# Patient Record
Sex: FEMALE | Race: WHITE
[De-identification: names, ages, dates, MRNs, and addresses within clinical notes are randomized per-mention and may not be internally consistent; named-entity substitution may affect disease eponyms.]

---

## 2021-12-20 ENCOUNTER — HOSPITAL ENCOUNTER (OUTPATIENT)
Dept: HOSPITAL 46 - DS | Age: 56
Discharge: HOME | End: 2021-12-20
Attending: SURGERY
Payer: COMMERCIAL

## 2021-12-20 VITALS — WEIGHT: 175.27 LBS | HEIGHT: 64 IN | BODY MASS INDEX: 29.92 KG/M2

## 2021-12-20 DIAGNOSIS — D12.0: ICD-10-CM

## 2021-12-20 DIAGNOSIS — D12.2: Primary | ICD-10-CM

## 2021-12-20 DIAGNOSIS — K57.30: ICD-10-CM

## 2021-12-20 PROCEDURE — 0DBK8ZX EXCISION OF ASCENDING COLON, VIA NATURAL OR ARTIFICIAL OPENING ENDOSCOPIC, DIAGNOSTIC: ICD-10-PCS | Performed by: SURGERY

## 2021-12-20 PROCEDURE — G0500 MOD SEDAT ENDO SERVICE >5YRS: HCPCS

## 2021-12-20 PROCEDURE — 0DBE8ZX EXCISION OF LARGE INTESTINE, VIA NATURAL OR ARTIFICIAL OPENING ENDOSCOPIC, DIAGNOSTIC: ICD-10-PCS | Performed by: SURGERY

## 2021-12-20 PROCEDURE — 0DBH8ZX EXCISION OF CECUM, VIA NATURAL OR ARTIFICIAL OPENING ENDOSCOPIC, DIAGNOSTIC: ICD-10-PCS | Performed by: SURGERY

## 2021-12-20 NOTE — NUR
PT ALERT, ORIENTED AND SEEMS PREPARED. PT HAS HAD PREVIOUS SCOPE, ALL OF HER
QUESTIONS ANSWERED. PT'S  WILL P-UP FOLLOWING DC. PT REQUSTTED PRAYER
WILL FOLLOW AS NEEDED

## 2021-12-20 NOTE — OR
[facility]
                                    [address], [place]  [address]
_________________________________________________________________________________________
                                                                 Draft    
 
 
DATE OF OPERATION:
12/20/2021
 
SURGEON:
Ed Garcia MD
 
PREOPERATIVE DIAGNOSIS:
History of adenomatous polyps, 2018.
 
POSTOPERATIVE DIAGNOSES:
1. Sigmoid and left-sided diverticulosis.
2. Polyps x4.
 
PROCEDURE:
Total colonoscopy to cecum with hot snare polypectomy x1, cold snare polypectomy x1 and
cold morcellation polypectomy x2. 
 
ANESTHESIA:
Intravenous sedation, fentanyl 100 mcg and Versed 6 mg.
 
INDICATION:
This 56-year-old white woman is a patient of Dr. Shafer.  She is practicing
optometrist locally.  She underwent colonoscopy by me in 2018, at which time she was
found to have adenomatous polyps.  She has no family history of colon cancer though her
mother had polyps.  She is admitted for surveillance colonoscopy.  She understands the
risks of bleeding, infection, and perforation. 
 
FINDINGS:
The prep was excellent.  Complete colonoscopy was undertaken to the cecum without
question.  She had four polyps in total, one in the cecum, one in the proximal ascending
colon, one at 30 cm and one at 20 cm.  All were excised completely and all were almost
certainly adenomatous. 
 
DESCRIPTION OF PROCEDURE:
The patient was brought to the endoscopy suite and placed in lateral decubitus position,
given intravenous sedation to the point of slurred speech and nystagmus.  Digital rectal
examination was normal. 
 
An Olympus video colonoscope was passed in the rectum and manipulated throughout the
colon noting diverticular change of the sigmoid and left colon.  The scope was passed
ultimately into the cecum.  Ileocecal valve and appendiceal orifice were normal.  A
small polyp was noted on a fold in the cecum.  This was excised with cold snare
 
                                                                                    
_________________________________________________________________________________________
PATIENT NAME:     SHAY DIAZ          
MEDICAL RECORD #: [id number]            OPERATIVE REPORT              
          ACCT #: [id number]  
DATE OF BIRTH:   [birth year]            REPORT #: [id number]      
PHYSICIAN:        ED GARCIA MD                 
PCP:              REJI SHAFER MD      
REPORT IS CONFIDENTIAL AND NOT TO BE RELEASED WITHOUT AUTHORIZATION
 
 
                                  [facility]
                                    [address], [place]  [address]
_________________________________________________________________________________________
                                                                 Draft    
 
 
technique.  Specimen was passed for pathology.  The scope was withdrawn and the right
colon proper was a smaller polyp.  Narrow band imaging confirmed this likely to be
adenomatous polyp, this was excised with cold morcellation technique.  The scope was
further withdrawn and there were no other findings until about 30 cm from the anal
verge, where a small, but pedunculated polyp was noted, this was excised with hot snare
polypectomy technique.  Specimen passed for pathology as well.  Further withdrawal at 20
cm showed a subtle, but certain adenomatous appearing polyp, which was excised with
multiple bites with cold morcellation device.  Further withdrawal showed no other
abnormality including retroflexed view, which was normal.  The scope was removed and the
patient was taken to the recovery room in good condition. 
 
CONCLUDING DIAGNOSIS:
Polyps x4 and diverticulosis.
 
PLAN:
Recommend a repeat colonoscopy in three years sooner if clinically indicated.  Recommend
high-fiber diet as well. 
 
 
 
            ________________________________________
            MD KENNEY Noriega/JARAD
Job #:  [id number]
DD:  12/20/2021 08:26:05
DT:  12/20/2021 09:25:49
 
cc:            Reji Shafer MD
 
 
Copies:  REJI SHAFER MD
~
 
 
 
 
 
 
 
 
 
                                                                                    
_________________________________________________________________________________________
PATIENT NAME:     ANNE MARIE ZHENGSHAY MARTIN          
MEDICAL RECORD #: [id number]            OPERATIVE REPORT              
          ACCT #: [id number]  
DATE OF BIRTH:   [birth year]            REPORT #: [id number]      
PHYSICIAN:        ED GARCIA MD                 
PCP:              REJI SHAFER MD      
REPORT IS CONFIDENTIAL AND NOT TO BE RELEASED WITHOUT AUTHORIZATION

## 2021-12-21 NOTE — PATH
[facility]
                                    [address], [place]  [address]
_________________________________________________________________________________________
                                                                 Signed   
 
 
 
SPECIMEN(S): A CECUM POLYP
SPECIMEN(S): B ASCENDING/RIGHT COLON
SPECIMEN(S): C POLYP AT 35 CM
SPECIMEN(S): D POLYP AT 20 CM
 
SPECIMEN SOURCE:
A. CECUM POLYP
B. ASCENDING/RIGHT COLON
C. POLYP AT 35 CM
D. POLYP AT 20 CM
 
CLINICAL HISTORY:
Colonoscopy.  History of tubular adenoma at 15 and 25 cm, 2018.
 
FINAL PATHOLOGIC DIAGNOSIS:
A.  Cecum, polypectomy:
-  Tubular adenoma.
B.  Colon, ascending/right, polypectomy:
-  Colonic mucosa with no significant pathologic changes.
C.  Colon, 35 cm, polypectomy:
-  Tubular adenoma.
D.  Colon, 20 cm, polypectomy:
-  Colonic mucosa with a  lymphoid aggregate.
BRP:vlg:C2NR
 
MICROSCOPIC EXAMINATION:
Histologic sections of all submitted blocks are examined by light microscopy.  
These findings, together with the gross examination, support the pathologic 
diagnosis. 
 
GROSS DESCRIPTION:
Four specimens are received in four containers, labeled "MM."
A.  The specimen, labeled "MM, cecal polyp," is received in formalin and 
consists of two tan soft tissue fragments that measure 0.2-0.7 cm in greatest 
dimension.  The specimen is entirely submitted in 
cassette (A1).
B.  The specimen, labeled "MM, right colon polyp," is received in formalin and 
consists of one tan soft tissue fragment that measures 0.2 cm in greatest 
dimension.  The specimen is entirely submitted 
in cassette (B1).
C.  The specimen, labeled "MM, polyp at 35 cm," is received in formalin and 
 
                                                                                    
_________________________________________________________________________________________
PATIENT NAME:     SHAY DIAZ          
MEDICAL RECORD #: [id number]            PATHOLOGY                     
          ACCT #: [id number]       ACCESSION #: [id number]     
DATE OF BIRTH:   [birth year]            REPORT #: [id number]       
PHYSICIAN:        BRUCE ABDULLAHI              
PCP:              REJI AGRAWAL MD      
REPORT IS CONFIDENTIAL AND NOT TO BE RELEASED WITHOUT AUTHORIZATION
 
 
                                  [facility]
                                    [address], [place]  [address]
_________________________________________________________________________________________
                                                                 Signed   
 
 
consists of three tan soft tissue fragments that measure 0.3-0.7 cm in greatest 
dimension.  The specimen is entirely 
submitted in cassette (C1).
D.  The specimen, labeled "MM, polyp at 20 cm," is received in formalin and 
consists of three tan soft tissue fragments that measure 0.2-0.4 cm in greatest 
dimension.  The specimen is entirely 
submitted in cassette (D1).
 
AT (under the direct supervision of a pathologist)
The Gross Description was prepared using a voice recognition system. The report 
was reviewed for accuracy; however, sound-alike word errors, addition and/or 
deletions may occur. If there is any 
question about this report, please contact Client Services.
 
PERFORMING LABORATORY:
The technical component was performed by [employer] [address] (Medical Director: Yoana Kearney MD; CLIA# [id number]). 
[facility] [address] [address] (CLIA# [id number]). 
 
Diagnostician:  Ulises Fabian MD
Pathologist
Electronically Signed 12/21/2021
 
 
Copies:                                
~
 
 
 
 
 
 
 
 
 
 
 
 
 
 
 
                                                                                    
_________________________________________________________________________________________
PATIENT NAME:     SHAY DIAZ          
MEDICAL RECORD #: [id number]            PATHOLOGY                     
          ACCT #: [id number]       ACCESSION #: [id number]     
DATE OF BIRTH:   [birth year]            REPORT #: [id number]       
PHYSICIAN:        BRUCE PATHOLOGY              
PCP:              REJI AGRAWAL MD      
REPORT IS CONFIDENTIAL AND NOT TO BE RELEASED WITHOUT AUTHORIZATION

## 2024-12-16 NOTE — NUR
12/16/24 0820 Koki Rajan
0808-PT ARRIVES TO PACU RESTING ON LT SIDE, PT A+O X4, DENIES PAIN OR
NUASEA, PT ENCOURAGED TO PASS GAS. VSS ON 1L VIS NC, RR EVEN AND
UNLABORED.
0810-PT TITRATED TO RA, VS REMAIN STABLE.
0815-DR. GARCIA AT BEDSIDE TO DISCUSS PROCEDURE RESULTS AND PLAN OF
CARE, ALL QUESTIONS ANSWERED.
0820-PT SITTING UP IN BED SIPPING ON WATER, DENIES PAIN, VSS ON RA.

## 2024-12-16 NOTE — OR
[facility]
                                    [address], [place]  [address]
_________________________________________________________________________________________
                                                                 Signed   
 
 
DATE OF OPERATION:
12/16/2024
 
SURGEON:
Ed Garcia MD
 
PREOPERATIVE DIAGNOSES:
1. History of tubular adenoma x2 2021.
2. Known diverticulosis.
 
POSTOPERATIVE DIAGNOSES:
Diverticulosis, left colon and sigmoid.  No evidence of polyp.
 
PROCEDURE:
Total colonoscopy to cecum.
 
ANESTHESIA:
Intravenous sedation; fentanyl 100 mcg, Versed 5 mg.
 
INDICATION:
This 59-year-old white woman is a patient of Dr. Shafer. She has undergone colonoscopy
by me in the past including in 2021, where she was noted to have four apparent polyps,
two of which were tubular adenomas, the others were lymphoid aggregates only.  She also
had diverticulosis.  She is admitted at this time to undergo surveillance colonoscopy.
She understands the risk of bleeding, infection, and perforation.  She is currently
symptom free and has no family history of colon cancer. 
 
FINDINGS:
The prep was excellent.  Complete colonoscopy was undertaken to the cecum with full
intubation of the cecum.  There were scattered diverticula of the sigmoid and left
colon, but no evidence of polyps or other abnormality. 
 
DESCRIPTION OF PROCEDURE:
The patient was brought to the endoscopy suite and placed in the lateral decubitus
position, given intravenous sedation to the point of slurred speech and nystagmus.
Digital rectal examination was normal. 
 
An Olympus video colonoscope was passed in the rectum and manipulated into the sigmoid
and left colon where diverticulosis was noted.  The scope was ultimately advanced to the
cecum.  Full intubation of the cecum was accomplished.  Appendiceal orifice and
ileocecal valve well identified.  The scope was then withdrawn. Examination throughout
showed no sign of abnormality other than diverticula.  Retroflexed view of the rectum
 
    Electronically Signed By: ED GARCIA MD  12/16/24 1512
_________________________________________________________________________________________
PATIENT NAME:     SHAY DIAZ          
MEDICAL RECORD #: [id number]            OPERATIVE REPORT              
          ACCT #: [id number]  
DATE OF BIRTH:   [birth year]            REPORT #: [id number]      
PHYSICIAN:        ED GARCIA MD                 
PCP:              REJI SHAFER MD      
REPORT IS CONFIDENTIAL AND NOT TO BE RELEASED WITHOUT AUTHORIZATION
 
 
                                  [facility]
                                    [address], [place]  [address]
_________________________________________________________________________________________
                                                                 Signed   
 
 
was normal.  The scope was removed and the patient was taken to recovery room in good
condition. 
 
CONCLUDING DIAGNOSIS:
Diverticulosis. No evidence of polyps.
 
PLAN:
Recommend repeat colonoscopy in 7 to 10 years based on current recommendations.  Would
maintain high-fiber diet as well.  She will return to the ongoing care of Dr. Shafer. 
 
 
 
            ________________________________________
            MD KENNEY Noriega/MODL
Job #:  [id number]
DD:  12/16/2024 08:11:40
DT:  12/16/2024 08:23:08
 
cc:            Dr. Shafer
 
 
Copies:                                
~
 
 
 
 
 
 
 
 
 
 
 
 
 
 
 
 
 
    Electronically Signed By: ED GARCIA MD  12/16/24 1512
_________________________________________________________________________________________
PATIENT NAME:     SHAY DIAZ          
MEDICAL RECORD #: [id number]            OPERATIVE REPORT              
          ACCT #: [id number]  
DATE OF BIRTH:   [birth year]            REPORT #: [id number]      
PHYSICIAN:        ED GARCIA MD                 
PCP:              REJI SHAFER MD      
REPORT IS CONFIDENTIAL AND NOT TO BE RELEASED WITHOUT AUTHORIZATION

## 2024-12-16 NOTE — NUR
VISITED DURING SPIRITUAL CARE ROUNDS.  PT IN OVERALL GOOD SPIRITS, NO
IMMEDIATE NEEDS.   PROVIDED SUPPORTIVE PRESENCE, HOSPITALITY, PRAYER,
FACILITATED INTERACTION WITH THERAPY ANIMAL.  PT EXPRESSED GRATITUDE.